# Patient Record
Sex: MALE | Race: BLACK OR AFRICAN AMERICAN | NOT HISPANIC OR LATINO | Employment: FULL TIME | URBAN - METROPOLITAN AREA
[De-identification: names, ages, dates, MRNs, and addresses within clinical notes are randomized per-mention and may not be internally consistent; named-entity substitution may affect disease eponyms.]

---

## 2024-01-12 ENCOUNTER — OFFICE VISIT (OUTPATIENT)
Dept: FAMILY MEDICINE | Facility: CLINIC | Age: 39
End: 2024-01-12

## 2024-01-12 VITALS
HEART RATE: 110 BPM | DIASTOLIC BLOOD PRESSURE: 86 MMHG | OXYGEN SATURATION: 97 % | RESPIRATION RATE: 16 BRPM | HEIGHT: 76 IN | BODY MASS INDEX: 30.56 KG/M2 | SYSTOLIC BLOOD PRESSURE: 136 MMHG | WEIGHT: 251 LBS

## 2024-01-12 DIAGNOSIS — R68.84 JAW PAIN: Primary | ICD-10-CM

## 2024-01-12 PROCEDURE — 99203 OFFICE O/P NEW LOW 30 MIN: CPT

## 2024-01-12 RX ORDER — OXYCODONE HYDROCHLORIDE 15 MG/1
15 TABLET ORAL EVERY 6 HOURS PRN
COMMUNITY
Start: 2024-01-08

## 2024-01-12 ASSESSMENT — PAIN SCALES - GENERAL: PAINLEVEL: SEVERE PAIN (7)

## 2024-01-12 NOTE — PROGRESS NOTES
Assessment & Plan     Jaw pain  I did lorena conversation with the patient that his maxillofacial area surgeon is the provider who should be prescribing his oxycodone.  I educated the patient to call his surgeon's clinic and speak with the nursing staff to get a refill and he should not have a problem with this.    In terms of his left great toe, there are no open areas of skin, or signs and symptoms of infection.  There does appear to be several areas of almost completely healed skin.  Encouraged him to stay off his foot and keep the area bandaged, with routine changes.      Nhi Sanitago PA-C  [facility]    Ashkan Adamson is a 39 year old, presenting for the following health issues:  workmans comp (Possible toe infection)        1/12/2024     9:49 AM   Additional Questions   Roomed by Norma BROWN LPN       History of Present Illness       Reason for visit:  Toe infections/ jaw pain  Symptom onset:  3-7 days ago  Symptoms include:  Pain in face  Symptom intensity:  Moderate  Symptom progression:  Staying the same  Had these symptoms before:  No  What makes it worse:  Talking  What makes it better:  Sleeping    He eats 0-1 servings of fruits and vegetables daily.He consumes 0 sweetened beverage(s) daily.He exercises with enough effort to increase his heart rate 9 or less minutes per day.  He exercises with enough effort to increase his heart rate 3 or less days per week.   He is taking medications regularly.       Patient reports a work injury on 1/8/2024.  States he also injured his toe at this time.  Patient does not inform me where he was working only that it was in [place].  States he was seen by maxillofacial area surgeon here in [place] and cannot get back into see them until 1/26/2024.  He is requesting a refill of his oxycodone and for assessment of his toe.      Review of Systems   Constitutional, HEENT, cardiovascular, pulmonary, gi and gu systems are negative,  "except as otherwise noted.      Objective    /86   Pulse 110   Resp 16   Ht 1.93 m (6' 4\")   Wt 113.9 kg (251 lb)   SpO2 97%   BMI 30.55 kg/m    Body mass index is 30.55 kg/m .  Physical Exam   GENERAL: healthy, alert and no distress.  HENT: Patient unable to fully open his mouth due to 2 black bands keeping his mouth shut.   RESP: normal respiratory effort   SKIN: Left foot: 1st digit toenail is missing. There are areas of dry skin. No open areas are noted. No erythema, warmth or swelling noted.                 "

## 2024-03-10 ENCOUNTER — HEALTH MAINTENANCE LETTER (OUTPATIENT)
Age: 39
End: 2024-03-10

## 2025-03-16 ENCOUNTER — HEALTH MAINTENANCE LETTER (OUTPATIENT)
Age: 40
End: 2025-03-16